# Patient Record
Sex: MALE | Race: WHITE | NOT HISPANIC OR LATINO | Employment: OTHER | URBAN - METROPOLITAN AREA
[De-identification: names, ages, dates, MRNs, and addresses within clinical notes are randomized per-mention and may not be internally consistent; named-entity substitution may affect disease eponyms.]

---

## 2017-10-16 ENCOUNTER — APPOINTMENT (OUTPATIENT)
Dept: GENERAL RADIOLOGY | Facility: HOSPITAL | Age: 77
End: 2017-10-16

## 2017-10-16 ENCOUNTER — HOSPITAL ENCOUNTER (EMERGENCY)
Facility: HOSPITAL | Age: 77
Discharge: HOME | End: 2017-10-16
Attending: EMERGENCY MEDICINE | Admitting: EMERGENCY MEDICINE

## 2017-10-16 VITALS
BODY MASS INDEX: 27.13 KG/M2 | WEIGHT: 179 LBS | SYSTOLIC BLOOD PRESSURE: 134 MMHG | HEIGHT: 68 IN | HEART RATE: 68 BPM | OXYGEN SATURATION: 94 % | TEMPERATURE: 97.2 F | RESPIRATION RATE: 16 BRPM | DIASTOLIC BLOOD PRESSURE: 93 MMHG

## 2017-10-16 DIAGNOSIS — J81.0 ACUTE PULMONARY EDEMA (HCC): ICD-10-CM

## 2017-10-16 DIAGNOSIS — J90 PLEURAL EFFUSION, BILATERAL: ICD-10-CM

## 2017-10-16 DIAGNOSIS — R06.00 DYSPNEA, UNSPECIFIED TYPE: ICD-10-CM

## 2017-10-16 DIAGNOSIS — I50.9 ACUTE DECOMPENSATED HEART FAILURE (HCC): Primary | ICD-10-CM

## 2017-10-16 LAB
ALBUMIN SERPL-MCNC: 3.7 G/DL (ref 3.5–5.2)
ALBUMIN/GLOB SERPL: 1.2 G/DL
ALP SERPL-CCNC: 79 U/L (ref 40–129)
ALT SERPL W P-5'-P-CCNC: 17 U/L (ref 5–41)
ANION GAP SERPL CALCULATED.3IONS-SCNC: 14.1 MMOL/L
AST SERPL-CCNC: 19 U/L (ref 5–40)
BASOPHILS # BLD AUTO: 0.02 10*3/MM3 (ref 0–0.2)
BASOPHILS NFR BLD AUTO: 0.3 % (ref 0–2)
BILIRUB SERPL-MCNC: 1.5 MG/DL (ref 0.2–1.2)
BUN BLD-MCNC: 24 MG/DL (ref 8–23)
BUN/CREAT SERPL: 20.9 (ref 7–25)
CALCIUM SPEC-SCNC: 8.7 MG/DL (ref 8.8–10.5)
CHLORIDE SERPL-SCNC: 103 MMOL/L (ref 98–107)
CO2 SERPL-SCNC: 23.9 MMOL/L (ref 22–29)
CREAT BLD-MCNC: 1.15 MG/DL (ref 0.76–1.27)
DEPRECATED RDW RBC AUTO: 61.7 FL (ref 37–54)
EOSINOPHIL # BLD AUTO: 0.05 10*3/MM3 (ref 0.1–0.3)
EOSINOPHIL NFR BLD AUTO: 0.6 % (ref 0–4)
ERYTHROCYTE [DISTWIDTH] IN BLOOD BY AUTOMATED COUNT: 17.8 % (ref 11.5–14.5)
GFR SERPL CREATININE-BSD FRML MDRD: 62 ML/MIN/1.73
GLOBULIN UR ELPH-MCNC: 3 GM/DL
GLUCOSE BLD-MCNC: 117 MG/DL (ref 65–99)
HCT VFR BLD AUTO: 37.5 % (ref 42–52)
HGB BLD-MCNC: 12 G/DL (ref 14–18)
IMM GRANULOCYTES # BLD: 0.04 10*3/MM3 (ref 0–0.03)
IMM GRANULOCYTES NFR BLD: 0.5 % (ref 0–0.5)
LYMPHOCYTES # BLD AUTO: 1.95 10*3/MM3 (ref 0.6–4.8)
LYMPHOCYTES NFR BLD AUTO: 24.7 % (ref 20–45)
MCH RBC QN AUTO: 30.1 PG (ref 27–31)
MCHC RBC AUTO-ENTMCNC: 32 G/DL (ref 31–37)
MCV RBC AUTO: 94 FL (ref 80–94)
MONOCYTES # BLD AUTO: 0.61 10*3/MM3 (ref 0–1)
MONOCYTES NFR BLD AUTO: 7.7 % (ref 3–8)
NEUTROPHILS # BLD AUTO: 5.24 10*3/MM3 (ref 1.5–8.3)
NEUTROPHILS NFR BLD AUTO: 66.2 % (ref 45–70)
NRBC BLD MANUAL-RTO: 0 /100 WBC (ref 0–0)
NT-PROBNP SERPL-MCNC: ABNORMAL PG/ML (ref 5–450)
PLATELET # BLD AUTO: 152 10*3/MM3 (ref 140–500)
PMV BLD AUTO: 10.5 FL (ref 7.4–10.4)
POTASSIUM BLD-SCNC: 4.3 MMOL/L (ref 3.5–5.2)
PROT SERPL-MCNC: 6.7 G/DL (ref 6–8.5)
RBC # BLD AUTO: 3.99 10*6/MM3 (ref 4.7–6.1)
SODIUM BLD-SCNC: 141 MMOL/L (ref 136–145)
TROPONIN T SERPL-MCNC: 0.01 NG/ML (ref 0–0.03)
TROPONIN T SERPL-MCNC: 0.02 NG/ML (ref 0–0.03)
WBC NRBC COR # BLD: 7.91 10*3/MM3 (ref 4.8–10.8)

## 2017-10-16 PROCEDURE — 93010 ELECTROCARDIOGRAM REPORT: CPT | Performed by: INTERNAL MEDICINE

## 2017-10-16 PROCEDURE — 99285 EMERGENCY DEPT VISIT HI MDM: CPT

## 2017-10-16 PROCEDURE — 84484 ASSAY OF TROPONIN QUANT: CPT | Performed by: EMERGENCY MEDICINE

## 2017-10-16 PROCEDURE — 96374 THER/PROPH/DIAG INJ IV PUSH: CPT

## 2017-10-16 PROCEDURE — 71020 HC CHEST PA AND LATERAL: CPT

## 2017-10-16 PROCEDURE — 99285 EMERGENCY DEPT VISIT HI MDM: CPT | Performed by: EMERGENCY MEDICINE

## 2017-10-16 PROCEDURE — 85025 COMPLETE CBC W/AUTO DIFF WBC: CPT | Performed by: EMERGENCY MEDICINE

## 2017-10-16 PROCEDURE — 93005 ELECTROCARDIOGRAM TRACING: CPT | Performed by: EMERGENCY MEDICINE

## 2017-10-16 PROCEDURE — 25010000002 FUROSEMIDE PER 20 MG: Performed by: EMERGENCY MEDICINE

## 2017-10-16 PROCEDURE — 80053 COMPREHEN METABOLIC PANEL: CPT | Performed by: EMERGENCY MEDICINE

## 2017-10-16 PROCEDURE — 94640 AIRWAY INHALATION TREATMENT: CPT

## 2017-10-16 PROCEDURE — 83880 ASSAY OF NATRIURETIC PEPTIDE: CPT | Performed by: EMERGENCY MEDICINE

## 2017-10-16 RX ORDER — ROPINIROLE 1 MG/1
1 TABLET, FILM COATED ORAL NIGHTLY
COMMUNITY

## 2017-10-16 RX ORDER — LEVOTHYROXINE SODIUM 0.05 MG/1
50 TABLET ORAL DAILY
COMMUNITY

## 2017-10-16 RX ORDER — IPRATROPIUM BROMIDE AND ALBUTEROL SULFATE 2.5; .5 MG/3ML; MG/3ML
3 SOLUTION RESPIRATORY (INHALATION) ONCE
Status: COMPLETED | OUTPATIENT
Start: 2017-10-16 | End: 2017-10-16

## 2017-10-16 RX ORDER — ASPIRIN 81 MG/1
81 TABLET ORAL DAILY
COMMUNITY

## 2017-10-16 RX ORDER — FUROSEMIDE 40 MG/1
40 TABLET ORAL DAILY
COMMUNITY

## 2017-10-16 RX ORDER — ASPIRIN 325 MG
162 TABLET ORAL ONCE
Status: DISCONTINUED | OUTPATIENT
Start: 2017-10-16 | End: 2017-10-16

## 2017-10-16 RX ORDER — ASPIRIN 81 MG/1
162 TABLET, CHEWABLE ORAL ONCE
Status: COMPLETED | OUTPATIENT
Start: 2017-10-16 | End: 2017-10-16

## 2017-10-16 RX ORDER — SODIUM CHLORIDE 0.9 % (FLUSH) 0.9 %
10 SYRINGE (ML) INJECTION AS NEEDED
Status: DISCONTINUED | OUTPATIENT
Start: 2017-10-16 | End: 2017-10-16 | Stop reason: HOSPADM

## 2017-10-16 RX ORDER — POTASSIUM CHLORIDE 750 MG/1
20 CAPSULE, EXTENDED RELEASE ORAL DAILY
COMMUNITY

## 2017-10-16 RX ORDER — GABAPENTIN 600 MG/1
600 TABLET ORAL 3 TIMES DAILY
COMMUNITY

## 2017-10-16 RX ORDER — HYDROCODONE BITARTRATE AND ACETAMINOPHEN 10; 325 MG/1; MG/1
1 TABLET ORAL EVERY 8 HOURS PRN
COMMUNITY

## 2017-10-16 RX ORDER — BACLOFEN 10 MG/1
10 TABLET ORAL NIGHTLY
COMMUNITY

## 2017-10-16 RX ORDER — FUROSEMIDE 10 MG/ML
60 INJECTION INTRAMUSCULAR; INTRAVENOUS ONCE
Status: COMPLETED | OUTPATIENT
Start: 2017-10-16 | End: 2017-10-16

## 2017-10-16 RX ORDER — ASPIRIN 81 MG/1
324 TABLET, CHEWABLE ORAL ONCE
Status: DISCONTINUED | OUTPATIENT
Start: 2017-10-16 | End: 2017-10-16

## 2017-10-16 RX ORDER — AMIODARONE HYDROCHLORIDE 200 MG/1
200 TABLET ORAL DAILY
COMMUNITY

## 2017-10-16 RX ORDER — ROPINIROLE 5 MG/1
5 TABLET, FILM COATED ORAL NIGHTLY
COMMUNITY

## 2017-10-16 RX ADMIN — ASPIRIN 81 MG 162 MG: 81 TABLET ORAL at 09:48

## 2017-10-16 RX ADMIN — IPRATROPIUM BROMIDE AND ALBUTEROL SULFATE 3 ML: .5; 3 SOLUTION RESPIRATORY (INHALATION) at 09:30

## 2017-10-16 RX ADMIN — FUROSEMIDE 60 MG: 10 INJECTION, SOLUTION INTRAMUSCULAR; INTRAVENOUS at 09:30

## 2017-10-16 NOTE — ED TRIAGE NOTES
Pt had a dirty bandage on his pacemaker site.  He was told not to remove the bandage and allow it to fall off on its own.

## 2017-10-16 NOTE — ED PROVIDER NOTES
Subjective   History of Present Illness     History of Present Illness    Chief complaint: Dyspnea with some chest discomfort    Location: Below right breast; radiates to back    Quality/Severity:  Mild to moderate but progressive    Timing/Duration: Progressive over 3-4 days    Modifying Factors: Worse with exertion and cough    Associated Symptoms: Lower extremity edema, mild minimally productive cough without hemoptysis but seems improved actually per the patient's report.  No reported fevers.  Normal appetite.    Narrative: 77-year-old male who is visiting from [facility] where he receives all of his medical care including recent implantation of a pacemaker in his left chest approximately 2 months ago who is retired deep  and former smoker presents the emergency department for evaluation of progressive dyspnea over the last 3-4 days.  He reports a history of edema of the lower extremity sore which he takes furosemide 40 mg twice daily.  He is also prescribed inhalers and nebulizer machine for lung disease.  He travels with his significant other who is an active smoker.  They drove to [facility] from [place] yesterday.  He reports even dyspnea at rest.    Primary care: [place]    Review of Systems  All other systems reviewed and are otherwise negative.    Past Medical History:   Diagnosis Date   • Arthritis    • CHF (congestive heart failure)    • Disease of thyroid gland    • Hypertension    • Injury of back     Pinched nerve L-4, L-5   • Myocardial infarction    • Ventricular tachycardia        No Known Allergies    Past Surgical History:   Procedure Laterality Date   • CORONARY ARTERY BYPASS GRAFT  2011   • PACEMAKER IMPLANTATION  08/2017    Includes defibrillator       History reviewed. No pertinent family history.    Social History     Social History   • Marital status:      Spouse name: N/A   • Number of children: N/A   • Years of education: N/A     Social History Main  Topics   • Smoking status: Former Smoker   • Smokeless tobacco: Former User   • Alcohol use No   • Drug use: No   • Sexual activity: Defer     Other Topics Concern   • None     Social History Narrative   • None       ED Triage Vitals   Temp Heart Rate Resp BP SpO2   10/16/17 0851 10/16/17 0851 10/16/17 0851 10/16/17 0851 10/16/17 0851   97.2 °F (36.2 °C) 73 18 137/100 93 %      Temp src Heart Rate Source Patient Position BP Location FiO2 (%)   10/16/17 0851 -- -- -- --   Oral         Objective   Physical Exam   Constitutional: He is oriented to person, place, and time. He appears well-developed. No distress.   Very pleasant, talkative and in no acute distress on exam.  Nontoxic appearing   HENT:   Head: Normocephalic.   Mouth/Throat: Oropharynx is clear and moist.   Eyes: Conjunctivae are normal. No scleral icterus.   Neck: Neck supple.   Painless movement   Cardiovascular: Normal rate, regular rhythm and intact distal pulses.    Pulmonary/Chest: Effort normal. No respiratory distress.   Diminished throughout.  No appreciable rhonchi or rales at the bases.   Abdominal: Soft. There is no tenderness.   Musculoskeletal: He exhibits no tenderness. Edema: 2-3 + pitting edema bilateral ankles and shins.   MAEE, normal strength   Neurological: He is alert and oriented to person, place, and time.   Skin: Skin is warm and dry.   Psychiatric: He has a normal mood and affect. Thought content normal.   Nursing note and vitals reviewed.      Procedures         ED Course  ED Course   Comment By Time   Attempting to obtain prior EKG and a recent cardiac testing from [facility]. Curtis Ruiz MD 10/16 0910   Patient is diuresing at this time.  He feels improved after some furosemide and a nebulizer treatment.  I have recommended a 3 hour troponin.  Patient is agreeable. Curtis Ruiz MD 10/16 1040   Troponin on a downward trend.  Patient has diuresed nearly a liter.  He feels well enough for discharge.  I recommend  close outpatient follow-up with his primary cardiologist. Curtis Ruiz MD 10/16 1230   Patient ambulated in the ED with no difficulty. Curtis Ruiz MD 10/16 1241      EKG           EKG time: 0855  Rhythm/Rate: Regular paced, 70  P waves and [place]: Very low amplitude P wave if present limits interp  QRS, axis: Wide-complex consistent with pacing   ST and T waves: QTC within normal limits     Interpreted contemporaneously with treatment by me, independently viewed  Comparison currently unavailable     Results for orders placed or performed during the hospital encounter of 10/16/17   Comprehensive Metabolic Panel   Result Value Ref Range    Glucose 117 (H) 65 - 99 mg/dL    BUN 24 (H) 8 - 23 mg/dL    Creatinine 1.15 0.76 - 1.27 mg/dL    Sodium 141 136 - 145 mmol/L    Potassium 4.3 3.5 - 5.2 mmol/L    Chloride 103 98 - 107 mmol/L    CO2 23.9 22.0 - 29.0 mmol/L    Calcium 8.7 (L) 8.8 - 10.5 mg/dL    Total Protein 6.7 6.0 - 8.5 g/dL    Albumin 3.70 3.50 - 5.20 g/dL    ALT (SGPT) 17 5 - 41 U/L    AST (SGOT) 19 5 - 40 U/L    Alkaline Phosphatase 79 40 - 129 U/L    Total Bilirubin 1.5 (H) 0.2 - 1.2 mg/dL    eGFR Non African Amer 62 >60 mL/min/1.73    Globulin 3.0 gm/dL    A/G Ratio 1.2 g/dL    BUN/Creatinine Ratio 20.9 7.0 - 25.0    Anion Gap 14.1 mmol/L   BNP   Result Value Ref Range    proBNP 15748.0 (H) 5.0 - 450.0 pg/mL   Troponin   Result Value Ref Range    Troponin T 0.020 0.000 - 0.030 ng/mL   CBC Auto Differential   Result Value Ref Range    WBC 7.91 4.80 - 10.80 10*3/mm3    RBC 3.99 (L) 4.70 - 6.10 10*6/mm3    Hemoglobin 12.0 (L) 14.0 - 18.0 g/dL    Hematocrit 37.5 (L) 42.0 - 52.0 %    MCV 94.0 80.0 - 94.0 fL    MCH 30.1 27.0 - 31.0 pg    MCHC 32.0 31.0 - 37.0 g/dL    RDW 17.8 (H) 11.5 - 14.5 %    RDW-SD 61.7 (H) 37.0 - 54.0 fl    MPV 10.5 (H) 7.4 - 10.4 fL    Platelets 152 140 - 500 10*3/mm3    Neutrophil % 66.2 45.0 - 70.0 %    Lymphocyte % 24.7 20.0 - 45.0 %    Monocyte % 7.7 3.0 - 8.0 %    Eosinophil % 0.6 0.0 -  "4.0 %    Basophil % 0.3 0.0 - 2.0 %    Immature Grans % 0.5 0.0 - 0.5 %    Neutrophils, Absolute 5.24 1.50 - 8.30 10*3/mm3    Lymphocytes, Absolute 1.95 0.60 - 4.80 10*3/mm3    Monocytes, Absolute 0.61 0.00 - 1.00 10*3/mm3    Eosinophils, Absolute 0.05 (L) 0.10 - 0.30 10*3/mm3    Basophils, Absolute 0.02 0.00 - 0.20 10*3/mm3    Immature Grans, Absolute 0.04 (H) 0.00 - 0.03 10*3/mm3    nRBC 0.0 0.0 - 0.0 /100 WBC   Troponin   Result Value Ref Range    Troponin T 0.014 0.000 - 0.030 ng/mL       Xr Chest 2 View    Result Date: 10/16/2017  Narrative: INDICATION:  Shortness of air and cough for 3 days. Congestive heart failure.  COMPARISON:  None.  FINDINGS: PA and lateral views of the chest.  The heart is enlarged. There is postsurgical change of CABG. A 3-lead AICD is noted of the left chest wall.  There is mild interstitial opacities in both lungs suggesting mild edema. Small bilateral pleural effusions are suspected. There appears to be background COPD.  No pneumothorax.      Impression:  1. Cardiomegaly and mild interstitial edema. 2. Small bilateral pleural effusions.  This report was finalized on 10/16/2017 9:35 AM by Dr. Curtis Alva MD.              MDM  Number of Diagnoses or Management Options     Amount and/or Complexity of Data Reviewed  Clinical lab tests: reviewed and ordered  Tests in the radiology section of CPT®: ordered and reviewed  Decide to obtain previous medical records or to obtain history from someone other than the patient: yes  Review and summarize past medical records: yes (  Prior records from [facility]: Indication for biventricular implantable cardioverter defibrillator - \"patient has ischemic cardiomyopathy, ejection fraction less than 35%, severe first-degree AV block with rate related left bundle with QRS duration of 160 at baseline with sinus rate of 50, has incomplete left bundle branch block with a QRS duration of 130.\"  )  Independent visualization of images, " tracings, or specimens: yes        Final diagnoses:   Acute decompensated heart failure   Acute pulmonary edema   Pleural effusion, bilateral   Dyspnea, unspecified type              Medication List      Changed          rOPINIRole 1 MG tablet   Commonly known as:  REQUIP   What changed:  Another medication with the same name was removed. Continue   taking this medication, and follow the directions you see here.           Follow-up Information     Follow up with Your primary care provider and cardiologist in [facility]. Call in 1 day.               Curtis Ruiz MD  10/16/17 2754

## 2017-10-16 NOTE — ED NOTES
Pt ambulated down the hallway with little assistance.  He did not become short of air and states he feels a lot better than when he arrived in the ED.     Sosa Wylie RN  10/16/17 9070

## 2018-02-23 ENCOUNTER — TELEPHONE (OUTPATIENT)
Dept: ONCOLOGY | Facility: CLINIC | Age: 78
End: 2018-02-23

## 2018-02-23 NOTE — TELEPHONE ENCOUNTER
Dr Fuentes is unfamiliar with patient, and patient hasn't been seen by Dr Fuentes.  I call and left a message and advised that patient be taken to nearest ER and follow up with PCP in their area.  Patient and family can call back if needed.

## 2018-02-23 NOTE — TELEPHONE ENCOUNTER
----- Message from Mae Srivastava sent at 2/22/2018 11:37 AM EST -----  Regarding: LAUREN - NOSE BLEEDS   Contact: [phone]  KENDY, GIRLFRIEND CALLED AND SAID HE HAS BEEN HAVING SEVERE NOSE BLEEDS FOR THE PAST TWO MORNINGS. HE SAID HIS NOSE HURTS REALLY BAD LIKE SOMEONE HAS HIT HIM WITH A BASEBALL BAT BEFORE THE BLEEDING STARTS. HIS NOSE WILL BLEED FOR 30 MINUTES BEFORE IT STOPS.

## 2018-02-23 NOTE — TELEPHONE ENCOUNTER
Returned call to [phone] and spoke to Yi. She confirmed the spelling of his name, immanuel, and that he has seen Dr. Fuentes before.     The patient has experienced nose bleeds the past two mornings. He bleeds, with clots, for about 35-40 minutes. The bleeding stops when he holds pressure. Patient is doing his normal daily routine when the nose bleeds occur. No other symptoms other than the bleeding. He is on Eliquis.     I told her I would touch base with the doctor and call them back. I was unable to find any records from our clinic in [facility] or [facility]. Attempted to call this number back 3 times, no answer.    I called again this morning and left a VM. Instructed them to go to the ER for evaluation if he started bleeding again. No PCP listed on this chart- unsure if it is the correct patient or not.

## 2018-02-23 NOTE — TELEPHONE ENCOUNTER
----- Message from Mae Srivastava sent at 2/22/2018 11:37 AM EST -----  Regarding: LAUREN - NOSE BLEEDS   Contact: [phone]  KENDY, GIRLFRIEND CALLED AND SAID HE HAS BEEN HAVING SEVERE NOSE BLEEDS FOR THE PAST TWO MORNINGS. HE SAID HIS NOSE HURTS REALLY BAD LIKE SOMEONE HAS HIT HIM WITH A BASEBALL BAT BEFORE THE BLEEDING STARTS. HIS NOSE WILL BLEED FOR 30 MINUTES BEFORE IT STOPS.